# Patient Record
Sex: MALE | Race: WHITE | Employment: STUDENT | URBAN - METROPOLITAN AREA
[De-identification: names, ages, dates, MRNs, and addresses within clinical notes are randomized per-mention and may not be internally consistent; named-entity substitution may affect disease eponyms.]

---
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
## 2021-04-02 RX ORDER — CLINDAMYCIN PHOSPHATE 10 MG/G
GEL TOPICAL
Qty: 30 G | Refills: 6 | OUTPATIENT
Start: 2021-04-02

## 2021-04-02 NOTE — TELEPHONE ENCOUNTER
I called the mobile number listed which is the Dad's phone number was given [phone] by the dad to call. I left a voice message to call us back with Fritz Che. He has never been seen by Dr. Silverio Barnett or any of our providers. Will need an appointment for the requested medication or he can try obtaining from his pediatrician.

## 2021-05-20 ENCOUNTER — HOSPITAL ENCOUNTER (EMERGENCY)
Facility: HOSPITAL | Age: 21
Discharge: HOME | End: 2021-05-20
Payer: MEDICAID

## 2021-05-20 ENCOUNTER — APPOINTMENT (OUTPATIENT)
Dept: GENERAL RADIOLOGY | Facility: HOSPITAL | Age: 21
End: 2021-05-20
Payer: MEDICAID

## 2021-05-20 VITALS
SYSTOLIC BLOOD PRESSURE: 118 MMHG | DIASTOLIC BLOOD PRESSURE: 73 MMHG | WEIGHT: 192 LBS | HEIGHT: 73 IN | RESPIRATION RATE: 20 BRPM | OXYGEN SATURATION: 98 % | HEART RATE: 85 BPM | TEMPERATURE: 99 F | BODY MASS INDEX: 25.45 KG/M2

## 2021-05-20 DIAGNOSIS — S52.125A CLOSED NONDISPLACED FRACTURE OF HEAD OF LEFT RADIUS, INITIAL ENCOUNTER: Primary | ICD-10-CM

## 2021-05-20 PROCEDURE — 73080 X-RAY EXAM OF ELBOW: CPT

## 2021-05-20 PROCEDURE — 99284 EMERGENCY DEPT VISIT MOD MDM: CPT

## 2021-05-20 PROCEDURE — 99283 EMERGENCY DEPT VISIT LOW MDM: CPT

## 2021-05-20 RX ORDER — TRAMADOL HYDROCHLORIDE 50 MG/1
TABLET ORAL EVERY 6 HOURS PRN
Qty: 10 TABLET | Refills: 0 | Status: SHIPPED | OUTPATIENT
Start: 2021-05-20 | End: 2021-05-27

## 2021-05-20 RX ORDER — IBUPROFEN 800 MG/1
800 TABLET ORAL ONCE
Status: COMPLETED | OUTPATIENT
Start: 2021-05-20 | End: 2021-05-20

## 2021-05-21 NOTE — ED INITIAL ASSESSMENT (HPI)
Patient to Er from home with c/o left arm pain around elbow s/p fall while playing basketball. +swelling, +cms distally. Denies head injury LOC. Skin intact.

## 2021-05-21 NOTE — ED PROVIDER NOTES
Patient Seen in: [facility] AND [facility] Emergency Department      History   Patient presents with:  Arm or Hand Injury    Stated Complaint: l arm injury     HPI/Subjective:   21yo/m with no chronic medical problems reports to the ED with complaints of left el heart sounds. Pulmonary:      Effort: Pulmonary effort is normal.      Breath sounds: Normal breath sounds. Abdominal:      General: Bowel sounds are normal.      Palpations: Abdomen is soft.    Musculoskeletal:         General: Swelling and tenderness injury    Plan  Long arm applied  Cms intact after application    Close fu with ortho    Counseled patient on home care, ss of worsening condition, reasons for immediate re-eval, importance of close fu                              Disposition and Plan

## 2021-05-28 ENCOUNTER — OFFICE VISIT (OUTPATIENT)
Dept: ORTHOPEDICS CLINIC | Facility: CLINIC | Age: 21
End: 2021-05-28
Payer: MEDICAID

## 2021-05-28 ENCOUNTER — HOSPITAL ENCOUNTER (OUTPATIENT)
Dept: GENERAL RADIOLOGY | Facility: HOSPITAL | Age: 21
Discharge: HOME | End: 2021-05-28
Attending: ORTHOPAEDIC SURGERY
Payer: MEDICAID

## 2021-05-28 VITALS — BODY MASS INDEX: 25.18 KG/M2 | HEIGHT: 73 IN | WEIGHT: 190 LBS

## 2021-05-28 DIAGNOSIS — S52.125A CLOSED NONDISPLACED FRACTURE OF HEAD OF LEFT RADIUS, INITIAL ENCOUNTER: Primary | ICD-10-CM

## 2021-05-28 DIAGNOSIS — S52.125A CLOSED NONDISPLACED FRACTURE OF HEAD OF LEFT RADIUS, INITIAL ENCOUNTER: ICD-10-CM

## 2021-05-28 PROCEDURE — 3008F BODY MASS INDEX DOCD: CPT | Performed by: ORTHOPAEDIC SURGERY

## 2021-05-28 PROCEDURE — 99243 OFF/OP CNSLTJ NEW/EST LOW 30: CPT | Performed by: ORTHOPAEDIC SURGERY

## 2021-05-28 PROCEDURE — 24650 CLTX RDL HEAD/NCK FX WO MNPJ: CPT | Performed by: ORTHOPAEDIC SURGERY

## 2021-05-28 PROCEDURE — 73070 X-RAY EXAM OF ELBOW: CPT | Performed by: ORTHOPAEDIC SURGERY

## 2021-05-29 NOTE — H&P
NURSING INTAKE COMMENTS: Patient presents with:  Fracture: left radial at elbow, splinted in ER      HPI: This 24year old male presents today with complaints of left elbow injury.   The patient sustained an acute injury to his left elbow on May 20, 2021 wh denies emesis, no diarrhea  :  denies dysuria or difficulty urinating  MUSCULOSKELETAL: See HPI  NEURO: See HPI  PSYCHE: Denies depression or anxiety  HEMATOLOGIC: Denies hx of blood clots, or easy bleeding    Physical Examination:    Ht 6' 1\" (1.854 m) COMPLETE (MIN 3 VIEWS), LEFT (CPT=73080), 5/20/2021, 8:28 PM.  INDICATIONS: Follow up left elbow fracture 1 week ago. TECHNIQUE: Two views were obtained. FINDINGS: BONES: Old healing nondisplaced fracture of the radial head/neck noted.   Bones appear [place]

## 2021-06-02 ENCOUNTER — OFFICE VISIT (OUTPATIENT)
Dept: DERMATOLOGY CLINIC | Facility: CLINIC | Age: 21
End: 2021-06-02
Payer: MEDICAID

## 2021-06-02 DIAGNOSIS — L70.0 ACNE VULGARIS: ICD-10-CM

## 2021-06-02 DIAGNOSIS — L73.9 FOLLICULITIS: Primary | ICD-10-CM

## 2021-06-02 DIAGNOSIS — L30.9 DERMATITIS: ICD-10-CM

## 2021-06-02 DIAGNOSIS — L85.8 KERATOSIS PILARIS RUBRA: ICD-10-CM

## 2021-06-02 PROCEDURE — 99203 OFFICE O/P NEW LOW 30 MIN: CPT | Performed by: DERMATOLOGY

## 2021-06-02 RX ORDER — HYDROCORTISONE 25 MG/ML
LOTION TOPICAL
Qty: 60 ML | Refills: 0 | Status: SHIPPED | OUTPATIENT
Start: 2021-06-02 | End: 2021-06-15

## 2021-06-02 RX ORDER — DOXYCYCLINE HYCLATE 100 MG/1
CAPSULE ORAL
Qty: 30 CAPSULE | Refills: 6 | Status: SHIPPED | OUTPATIENT
Start: 2021-06-02 | End: 2021-12-28

## 2021-06-07 NOTE — PROGRESS NOTES
Yesenia Zayas is a 24year old male. Patient presents with:  Derm Problem: LOV (12/27/2011 ) Patient present with itchy red face . Patient c/o itching and redness after shaving. Patient denies any changed in facial soap.  Patient used OTC lotion for i Allergies    Past Medical History:   Diagnosis Date   • Closed right tibial fracture 9/2015     History reviewed. No pertinent surgical history.   Social History    Socioeconomic History      Marital status: Single      Spouse name: Not on file      Number with itchy red face . Patient c/o itching and redness after shaving. Patient denies any changed in facial soap.  Patient used OTC lotion for itching with no relief     Patient last office visit with [address] [facility] in 2011 concerned with redness over the face, bumpy ski eczematous areas and post shaving. Tretinoin start every 2 to 3 days combined with hydrocortisone to help with areas of folliculitis, pseudofolliculitis. And for management of acne. Keratosis pilaris. Pathophysiology discussed.   Chronic nature as und results found for this or any previous visit (from the past 50 hour(s)). Meds This Visit:      Imaging Orders:  None     Referral Orders:  No orders of the defined types were placed in this encounter.

## 2021-06-14 ENCOUNTER — TELEPHONE (OUTPATIENT)
Dept: DERMATOLOGY CLINIC | Facility: CLINIC | Age: 21
End: 2021-06-14

## 2021-06-15 ENCOUNTER — PATIENT MESSAGE (OUTPATIENT)
Dept: DERMATOLOGY CLINIC | Facility: CLINIC | Age: 21
End: 2021-06-15

## 2021-06-15 RX ORDER — HYDROCORTISONE 25 MG/ML
LOTION TOPICAL
Qty: 59 ML | Refills: 0 | Status: SHIPPED | OUTPATIENT
Start: 2021-06-15

## 2021-06-15 NOTE — TELEPHONE ENCOUNTER
Pt provided no additional info other than he did need rf. Message sent for more detailed information as he appears to be using this differently than prescribed.

## 2021-06-15 NOTE — TELEPHONE ENCOUNTER
Message sent back to pt, this is an excessive amount for what he should be using this for    Please triage further

## 2021-07-02 ENCOUNTER — OFFICE VISIT (OUTPATIENT)
Dept: ORTHOPEDICS CLINIC | Facility: CLINIC | Age: 21
End: 2021-07-02
Payer: MEDICAID

## 2021-07-02 ENCOUNTER — HOSPITAL ENCOUNTER (OUTPATIENT)
Dept: GENERAL RADIOLOGY | Facility: HOSPITAL | Age: 21
Discharge: HOME | End: 2021-07-02
Attending: ORTHOPAEDIC SURGERY
Payer: MEDICAID

## 2021-07-02 VITALS — BODY MASS INDEX: 25.18 KG/M2 | WEIGHT: 190 LBS | HEIGHT: 73 IN

## 2021-07-02 DIAGNOSIS — S52.125A CLOSED NONDISPLACED FRACTURE OF HEAD OF LEFT RADIUS, INITIAL ENCOUNTER: ICD-10-CM

## 2021-07-02 DIAGNOSIS — S52.125D CLOSED NONDISPLACED FRACTURE OF HEAD OF LEFT RADIUS WITH ROUTINE HEALING, SUBSEQUENT ENCOUNTER: Primary | ICD-10-CM

## 2021-07-02 PROCEDURE — 3008F BODY MASS INDEX DOCD: CPT | Performed by: ORTHOPAEDIC SURGERY

## 2021-07-02 PROCEDURE — 99024 POSTOP FOLLOW-UP VISIT: CPT | Performed by: ORTHOPAEDIC SURGERY

## 2021-07-02 PROCEDURE — 73070 X-RAY EXAM OF ELBOW: CPT | Performed by: ORTHOPAEDIC SURGERY

## 2021-07-03 NOTE — PROGRESS NOTES
NURSING INTAKE COMMENTS: Patient presents with: Follow - Up: pt in for f/u left radial at elbow, denies any pain today      HPI: This 24year old male presents today with complaints of follow-up left radial head fracture.   Patient has had improved left el HPI    Physical Examination:    Ht 6' 1\" (1.854 m)   Wt 190 lb (86.2 kg)   BMI 25.07 kg/m²   General appearance: alert and oriented, not in acute distress  Vascular: 2+ and symmetric pulses  Skin: intact and benign  Neurologic: Bilateral sensation intact to full activity and heavier lifting. If he has any recurrent pain or injury he should follow-up in the clinic for repeat evaluation and x-rays. The above note was creating using Dragon speech recognition technology. Please excuse any typos.     Ayesha Cerda Me

## 2021-07-13 RX ORDER — HYDROCORTISONE 25 MG/ML
LOTION TOPICAL
Qty: 59 ML | Refills: 0 | OUTPATIENT
Start: 2021-07-13

## 2021-07-28 ENCOUNTER — OFFICE VISIT (OUTPATIENT)
Dept: FAMILY MEDICINE CLINIC | Facility: CLINIC | Age: 21
End: 2021-07-28
Payer: MEDICAID

## 2021-07-28 VITALS
WEIGHT: 191.19 LBS | TEMPERATURE: 99 F | HEIGHT: 73 IN | BODY MASS INDEX: 25.34 KG/M2 | HEART RATE: 74 BPM | DIASTOLIC BLOOD PRESSURE: 68 MMHG | SYSTOLIC BLOOD PRESSURE: 115 MMHG

## 2021-07-28 DIAGNOSIS — Z02.5 SPORTS PHYSICAL: Primary | ICD-10-CM

## 2021-07-28 PROCEDURE — 3074F SYST BP LT 130 MM HG: CPT | Performed by: NURSE PRACTITIONER

## 2021-07-28 PROCEDURE — 3008F BODY MASS INDEX DOCD: CPT | Performed by: NURSE PRACTITIONER

## 2021-07-28 PROCEDURE — 3078F DIAST BP <80 MM HG: CPT | Performed by: NURSE PRACTITIONER

## 2021-07-28 PROCEDURE — 99395 PREV VISIT EST AGE 18-39: CPT | Performed by: NURSE PRACTITIONER

## 2021-07-28 NOTE — PROGRESS NOTES
HPI  Pt here for physical/sports physical. 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 [employer]    Has had J&J  covid vaccination    Had left elbow fx in early summer-seen by ortho and cleared for play low contact now and full contact 8/2-pt denies any pain, swelling or weak Vaccine: Birth to 64yrs Completed  Hepatitis B Vaccines Completed  MMR Vaccines Completed  Varicella Vaccines Completed  Meningococcal Vaccine Completed  HPV Vaccines Completed  COVID-19 Vaccine Completed  Hepatitis A Vaccines Aged Out    Past Medical Hist Partner Violence:       Fear of Current or Ex-Partner:       Emotionally Abused:       Physically Abused:       Sexually Abused:     Current Outpatient Medications   Medication Sig Dispense Refill   • Hydrocortisone 2.5 % External Lotion APPLY TOPICALLY TO the left inguinal area or right inguinal area. Genitourinary:     Penis: Normal and circumcised. No phimosis, erythema, tenderness or discharge.        Testes: Normal.      Epididymis:      Right: Normal.      Left: Normal.       Musculoskeletal:

## 2021-08-21 NOTE — TELEPHONE ENCOUNTER
LOV 06/02/2021 pt requesting refill for TRETINOIN 0.05 % External Cream please advise thank you [facility]

## 2021-12-28 RX ORDER — DOXYCYCLINE HYCLATE 100 MG/1
CAPSULE ORAL
Qty: 30 CAPSULE | Refills: 0 | Status: SHIPPED | OUTPATIENT
Start: 2021-12-28

## 2021-12-28 NOTE — TELEPHONE ENCOUNTER
PETRA, LOV 6/2021 - will try to obtain update, pt did no respond to last mychart msg. Dr. Victor Manuel Gerard do you wish to approve? Or wait for update?

## 2022-07-29 ENCOUNTER — OFFICE VISIT (OUTPATIENT)
Dept: FAMILY MEDICINE CLINIC | Facility: CLINIC | Age: 22
End: 2022-07-29
Payer: MEDICAID

## 2022-07-29 ENCOUNTER — LAB ENCOUNTER (OUTPATIENT)
Dept: LAB | Age: 22
End: 2022-07-29
Attending: FAMILY MEDICINE
Payer: MEDICAID

## 2022-07-29 VITALS
TEMPERATURE: 98 F | HEIGHT: 73 IN | SYSTOLIC BLOOD PRESSURE: 115 MMHG | HEART RATE: 55 BPM | DIASTOLIC BLOOD PRESSURE: 74 MMHG | BODY MASS INDEX: 26.11 KG/M2 | WEIGHT: 197 LBS

## 2022-07-29 DIAGNOSIS — Z00.00 ADULT GENERAL MEDICAL EXAM: ICD-10-CM

## 2022-07-29 DIAGNOSIS — Z00.00 ADULT GENERAL MEDICAL EXAM: Primary | ICD-10-CM

## 2022-07-29 DIAGNOSIS — Z23 NEED FOR VACCINATION: ICD-10-CM

## 2022-07-29 LAB
ALBUMIN SERPL-MCNC: 4.1 G/DL (ref 3.4–5)
ALBUMIN/GLOB SERPL: 1.2 {RATIO} (ref 1–2)
ALP LIVER SERPL-CCNC: 66 U/L
ALT SERPL-CCNC: 31 U/L
ANION GAP SERPL CALC-SCNC: 7 MMOL/L (ref 0–18)
AST SERPL-CCNC: 34 U/L (ref 15–37)
BASOPHILS # BLD AUTO: 0.02 X10(3) UL (ref 0–0.2)
BASOPHILS NFR BLD AUTO: 0.3 %
BILIRUB SERPL-MCNC: 1 MG/DL (ref 0.1–2)
BUN BLD-MCNC: 15 MG/DL (ref 7–18)
BUN/CREAT SERPL: 11.4 (ref 10–20)
CALCIUM BLD-MCNC: 9.4 MG/DL (ref 8.5–10.1)
CHLORIDE SERPL-SCNC: 106 MMOL/L (ref 98–112)
CHOLEST SERPL-MCNC: 140 MG/DL (ref ?–200)
CO2 SERPL-SCNC: 27 MMOL/L (ref 21–32)
CREAT BLD-MCNC: 1.32 MG/DL
DEPRECATED RDW RBC AUTO: 43.7 FL (ref 35.1–46.3)
EOSINOPHIL # BLD AUTO: 0.02 X10(3) UL (ref 0–0.7)
EOSINOPHIL NFR BLD AUTO: 0.3 %
ERYTHROCYTE [DISTWIDTH] IN BLOOD BY AUTOMATED COUNT: 12.6 % (ref 11–15)
FASTING PATIENT LIPID ANSWER: YES
FASTING STATUS PATIENT QL REPORTED: YES
GLOBULIN PLAS-MCNC: 3.4 G/DL (ref 2.8–4.4)
GLUCOSE BLD-MCNC: 89 MG/DL (ref 70–99)
HCT VFR BLD AUTO: 48 %
HDLC SERPL-MCNC: 62 MG/DL (ref 40–59)
HGB BLD-MCNC: 15.9 G/DL
IMM GRANULOCYTES # BLD AUTO: 0.01 X10(3) UL (ref 0–1)
IMM GRANULOCYTES NFR BLD: 0.2 %
LDLC SERPL CALC-MCNC: 68 MG/DL (ref ?–100)
LYMPHOCYTES # BLD AUTO: 1.71 X10(3) UL (ref 1–4)
LYMPHOCYTES NFR BLD AUTO: 26.1 %
MCH RBC QN AUTO: 30.9 PG (ref 26–34)
MCHC RBC AUTO-ENTMCNC: 33.1 G/DL (ref 31–37)
MCV RBC AUTO: 93.2 FL
MONOCYTES # BLD AUTO: 0.53 X10(3) UL (ref 0.1–1)
MONOCYTES NFR BLD AUTO: 8.1 %
NEUTROPHILS # BLD AUTO: 4.26 X10 (3) UL (ref 1.5–7.7)
NEUTROPHILS # BLD AUTO: 4.26 X10(3) UL (ref 1.5–7.7)
NEUTROPHILS NFR BLD AUTO: 65 %
NONHDLC SERPL-MCNC: 78 MG/DL (ref ?–130)
OSMOLALITY SERPL CALC.SUM OF ELEC: 290 MOSM/KG (ref 275–295)
PLATELET # BLD AUTO: 240 10(3)UL (ref 150–450)
POTASSIUM SERPL-SCNC: 3.9 MMOL/L (ref 3.5–5.1)
PROT SERPL-MCNC: 7.5 G/DL (ref 6.4–8.2)
RBC # BLD AUTO: 5.15 X10(6)UL
SODIUM SERPL-SCNC: 140 MMOL/L (ref 136–145)
TRIGL SERPL-MCNC: 41 MG/DL (ref 30–149)
TSI SER-ACNC: 3.7 MIU/ML (ref 0.36–3.74)
VLDLC SERPL CALC-MCNC: 6 MG/DL (ref 0–30)
WBC # BLD AUTO: 6.6 X10(3) UL (ref 4–11)

## 2022-07-29 PROCEDURE — 90471 IMMUNIZATION ADMIN: CPT | Performed by: FAMILY MEDICINE

## 2022-07-29 PROCEDURE — 3008F BODY MASS INDEX DOCD: CPT | Performed by: FAMILY MEDICINE

## 2022-07-29 PROCEDURE — 80061 LIPID PANEL: CPT

## 2022-07-29 PROCEDURE — 36415 COLL VENOUS BLD VENIPUNCTURE: CPT

## 2022-07-29 PROCEDURE — 80053 COMPREHEN METABOLIC PANEL: CPT

## 2022-07-29 PROCEDURE — 99395 PREV VISIT EST AGE 18-39: CPT | Performed by: FAMILY MEDICINE

## 2022-07-29 PROCEDURE — 3074F SYST BP LT 130 MM HG: CPT | Performed by: FAMILY MEDICINE

## 2022-07-29 PROCEDURE — 84443 ASSAY THYROID STIM HORMONE: CPT

## 2022-07-29 PROCEDURE — 3078F DIAST BP <80 MM HG: CPT | Performed by: FAMILY MEDICINE

## 2022-07-29 PROCEDURE — 90715 TDAP VACCINE 7 YRS/> IM: CPT | Performed by: FAMILY MEDICINE

## 2022-07-29 PROCEDURE — 85025 COMPLETE CBC W/AUTO DIFF WBC: CPT

## 2022-08-27 NOTE — PATIENT INSTRUCTIONS
Well-Child Checkup: 15 to 25 Years     Stay involved in your teen’s life. Make sure your teen knows you’re always there when he or she needs to talk. During the teen years, it’s important to keep having yearly checkups.  Your teen may be embarrassed abo · Body changes. The body grows and matures during puberty. Hair will grow in the pubic area and on other parts of the body. Girls grow breasts and menstruate (have monthly periods). A boy’s voice changes, becoming lower and deeper.  As the penis matures, er · Eat healthy. Your child should eat fruits, vegetables, lean meats, and whole grains every day. Less healthy foods—like french fries, candy, and chips—should be eaten rarely.  Some teens fall into the trap of snacking on junk food and fast food throughout · Encourage your teen to keep a consistent bedtime, even on weekends. Sleeping is easier when the body follows a routine. Don’t let your teen stay up too late at night or sleep in too long in the morning. · Help your teen wake up, if needed.  Go into the b · Set rules and limits around driving and use of the car. If your teen gets a ticket or has an accident, there should be consequences. Driving is a privilege that can be taken away if your child doesn’t follow the rules.   · Teach your child to make good de © 4344-0822 [employer] [address]. [address] [facility], [address] [place]. All rights reserved. This information is not intended as a substitute for professional medical care. Always follow your healthcare professional's instructions.         Wt Read 09/12/2003      TDAP                  05/23/2012      Varicella             07/12/2002 05/23/2012    Pended                  Date(s) Pended    Hpv Virus Vaccine 9 Mounika Im                          06/04/2019      Meningococcal B, Omv This content is reviewed periodically and is subject to change as new health information becomes available.  The information is intended to inform and educate and is not a replacement for medical evaluation, advice, diagnosis or treatment by a healthcare pr [facility]

## 2023-01-13 ENCOUNTER — LAB ENCOUNTER (OUTPATIENT)
Dept: LAB | Age: 23
End: 2023-01-13
Attending: FAMILY MEDICINE
Payer: MEDICAID

## 2023-01-13 DIAGNOSIS — N28.9 RENAL INSUFFICIENCY: ICD-10-CM

## 2023-01-13 LAB
ANION GAP SERPL CALC-SCNC: 2 MMOL/L (ref 0–18)
BUN BLD-MCNC: 16 MG/DL (ref 7–18)
BUN/CREAT SERPL: 12.8 (ref 10–20)
CALCIUM BLD-MCNC: 9.7 MG/DL (ref 8.5–10.1)
CHLORIDE SERPL-SCNC: 103 MMOL/L (ref 98–112)
CO2 SERPL-SCNC: 31 MMOL/L (ref 21–32)
CREAT BLD-MCNC: 1.25 MG/DL
FASTING STATUS PATIENT QL REPORTED: NO
GFR SERPLBLD BASED ON 1.73 SQ M-ARVRAT: 84 ML/MIN/1.73M2 (ref 60–?)
GLUCOSE BLD-MCNC: 99 MG/DL (ref 70–99)
OSMOLALITY SERPL CALC.SUM OF ELEC: 283 MOSM/KG (ref 275–295)
POTASSIUM SERPL-SCNC: 4.2 MMOL/L (ref 3.5–5.1)
SODIUM SERPL-SCNC: 136 MMOL/L (ref 136–145)

## 2023-01-13 PROCEDURE — 80048 BASIC METABOLIC PNL TOTAL CA: CPT

## 2023-01-13 PROCEDURE — 36415 COLL VENOUS BLD VENIPUNCTURE: CPT

## 2023-01-20 ENCOUNTER — OFFICE VISIT (OUTPATIENT)
Dept: PODIATRY CLINIC | Facility: CLINIC | Age: 23
End: 2023-01-20

## 2023-01-20 DIAGNOSIS — L60.3 NAIL DYSTROPHY: Primary | ICD-10-CM

## 2023-01-20 PROCEDURE — 99203 OFFICE O/P NEW LOW 30 MIN: CPT | Performed by: STUDENT IN AN ORGANIZED HEALTH CARE EDUCATION/TRAINING PROGRAM

## 2023-01-22 LAB — CALCOFLUOR WHITE STAIN RESULT: NEGATIVE

## 2023-11-06 ENCOUNTER — OFFICE VISIT (OUTPATIENT)
Dept: FAMILY MEDICINE CLINIC | Facility: CLINIC | Age: 23
End: 2023-11-06

## 2023-11-06 VITALS
BODY MASS INDEX: 25.22 KG/M2 | HEART RATE: 51 BPM | SYSTOLIC BLOOD PRESSURE: 123 MMHG | WEIGHT: 190.25 LBS | DIASTOLIC BLOOD PRESSURE: 69 MMHG | HEIGHT: 73 IN | TEMPERATURE: 97 F

## 2023-11-06 DIAGNOSIS — Z23 NEED FOR VACCINATION: ICD-10-CM

## 2023-11-06 DIAGNOSIS — L90.5 SCAR OF FACE: Primary | ICD-10-CM

## 2023-11-06 DIAGNOSIS — S01.80XA OPEN WOUND OF FACE, INITIAL ENCOUNTER: ICD-10-CM

## 2024-01-02 ENCOUNTER — OFFICE VISIT (OUTPATIENT)
Dept: DERMATOLOGY CLINIC | Facility: CLINIC | Age: 24
End: 2024-01-02

## 2024-01-02 DIAGNOSIS — L91.0 HYPERTROPHIC SCAR: ICD-10-CM

## 2024-01-02 DIAGNOSIS — L21.9 SEBORRHEIC DERMATITIS: Primary | ICD-10-CM

## 2024-01-02 PROCEDURE — 99214 OFFICE O/P EST MOD 30 MIN: CPT | Performed by: STUDENT IN AN ORGANIZED HEALTH CARE EDUCATION/TRAINING PROGRAM

## 2024-01-02 PROCEDURE — 11900 INJECT SKIN LESIONS </W 7: CPT | Performed by: STUDENT IN AN ORGANIZED HEALTH CARE EDUCATION/TRAINING PROGRAM

## 2024-01-02 RX ORDER — FLUOCINONIDE TOPICAL SOLUTION USP, 0.05% 0.5 MG/ML
SOLUTION TOPICAL
Qty: 60 ML | Refills: 5 | Status: SHIPPED | OUTPATIENT
Start: 2024-01-02

## 2024-01-02 RX ORDER — KETOCONAZOLE 20 MG/ML
SHAMPOO TOPICAL
Qty: 120 ML | Refills: 11 | Status: SHIPPED | OUTPATIENT
Start: 2024-01-02

## 2024-01-02 NOTE — PROGRESS NOTES
New Patient    Referred by: No referring provider defined for this encounter.    CHIEF COMPLAINT: Lesion of concern     HISTORY OF PRESENT ILLNESS: Christopher Chao is a 23 year old male here for evaluation of lesion of concern.    1. Growth   Location: Above right lip  Duration: 1 year  Signs and symptoms: Raised   Current treatment: OTC silicon gel  Past treatments: None      Personal Dermatologic History  History of skin cancer: No  History of  atypical moles: No    FAMILY HISTORY:  History of melanoma: No    Past Medical History  Past Medical History:   Diagnosis Date    Closed right tibial fracture 9/2015       REVIEW OF SYSTEMS:  Constitutional: Denies fever, chills, unintentional weight loss.   Skin as per HPI    Medications  Current Outpatient Medications   Medication Sig Dispense Refill    Multiple Vitamin (MULTI VITAMIN DAILY OR) No of Refills: 0      doxycycline 100 MG Oral Cap TAKE ONE CAPSULE BY MOUTH EVERY DAY (Patient not taking: Reported on 11/6/2023) 30 capsule 0    Tretinoin 0.05 % External Cream APPLY TOPICALLY TO ENTIRE FACE, CHEST, AND BACK EVERY NIGHT AT BEDTIME (Patient not taking: Reported on 11/6/2023) 45 g 0    Hydrocortisone 2.5 % External Lotion APPLY TOPICALLY TO THE AFFECTED AREA EVERY DAY AS NEEDED FOR IRRITATION (Patient not taking: Reported on 11/6/2023) 59 mL 0    metRONIDAZOLE 0.75 % External Cream Use bid to affected areas of face (Patient not taking: Reported on 11/6/2023) 60 g 12    CLINDAMYCIN PHOSPHATE 1 % External Gel APPLY EXTERNALLY TO THE AFFECTED AREA TWICE DAILY (Patient not taking: Reported on 11/6/2023) 30 g 6    Clindamycin Phos-Benzoyl Perox 1.2-5 % External Gel SHERICE EXT AA BID (Patient not taking: Reported on 11/6/2023) 1 Tube 6       PHYSICAL EXAM:  General: awake, alert, no acute distress  Neuropsych: appropriate mood and affect  Eyes: Sclerae anicteric, without conjunctival injection, eyelids unremarkable  Skin: Skin exam was performed today including the  following: scalp and upper cutaneous lip . Pertinent findings include:   - Upper cutaneous lip with a pink sclerotic papule  - Scalp with greasy yellow scaling    ASSESSMENT & PLAN:  Pathophysiology of diagnoses discussed with patient.  Therapeutic options reviewed. Risks, benefits, and alternatives discussed with patient. Instructions reviewed at length.    #Seborrheic dermatitis  - We discussed the etiology, natural history, and chronic, waxing/waning nature of seborrheic dermatitis. Educated patient that seborrheic dermatitis is typically a chronic problem due to inflammation in response to yeast (Malassezia species). Discussed that maintenance is desired rather than cure.    For the scalp:  - Prescribed ketoconazole 2% shampoo three times weekly, leaving in five to ten minutes before washing out. Use over-the-counter Neutrogena T/gel, Selsun, Head & Shoulders, ZincOn shampoo on other days. Consider occasionally rotating shampoos, given that the efficacy of certain products can change with time.  - Lidex solution to scalp Monday-Friday. Take weekends off. Avoid use on face, breasts, groin, or axiillae. Counseled patient regarding side effects of topical steroids including, but are not limited to: atrophy, striae, telangectasias, tachyphylaxis, pigmentary changes, as well as risk of cataracts and glaucoma with periocular use.  Informed patient that topical steroids should only be used on active skin lesions and not on normal skin.       #Hypertrophic scar  - Due to the bothersome nature of the scar today, recommend injection with Kenalog, as below. Side effects of intralesional steroid injection discussed, including the potential for atrophy, striae, telangiectasias, and pigmentary changes.  Patient understands and would like to proceed with injection today.  - After cleansing with alcohol, cutaneous lip injected with a total of 0.1 ml of Kenalog 10mg/ml. Patient tolerated procedure well. Bandage applied to  injection site.    Return to clinic: 3 months or sooner if something concerning arises     John Walker MD

## 2024-02-06 ENCOUNTER — OFFICE VISIT (OUTPATIENT)
Dept: DERMATOLOGY CLINIC | Facility: CLINIC | Age: 24
End: 2024-02-06

## 2024-02-06 DIAGNOSIS — L90.5 SCAR CONDITION AND FIBROSIS OF SKIN: ICD-10-CM

## 2024-02-06 DIAGNOSIS — L91.0 HYPERTROPHIC SCAR: Primary | ICD-10-CM

## 2024-02-06 PROCEDURE — 11900 INJECT SKIN LESIONS </W 7: CPT | Performed by: STUDENT IN AN ORGANIZED HEALTH CARE EDUCATION/TRAINING PROGRAM

## 2024-02-06 NOTE — PROGRESS NOTES
Established Patient    Referred by: No referring provider defined for this encounter.    CHIEF COMPLAINT: Spot on Right upper lip    HISTORY OF PRESENT ILLNESS: Christopher Chao is a 23 year old male here for evaluation of spot on right upper lip    1. Growth   Location: Right upper lip  Duration: 1 year  Signs and symptoms: Spot went down a little after the shot but they got bigger.  Current treatment: NONE  Past treatments: Kenalog shot      Personal Dermatologic History  History of skin cancer: No  History of  atypical moles: No    FAMILY HISTORY:  History of melanoma: No    Past Medical History  Past Medical History:   Diagnosis Date    Closed right tibial fracture 9/2015       REVIEW OF SYSTEMS:  Constitutional: Denies fever, chills, unintentional weight loss.   Skin as per HPI    Medications  Current Outpatient Medications   Medication Sig Dispense Refill    Fluocinonide 0.05 % External Solution Use twice daily Monday-Friday with flares. Do not use on face. 60 mL 5    ketoconazole 2 % External Shampoo Use 2-3 times weekly. Lather into scalp and leave on for 5 minutes before washing off. 120 mL 11    Multiple Vitamin (MULTI VITAMIN DAILY OR) No of Refills: 0 (Patient not taking: Reported on 1/2/2024)      doxycycline 100 MG Oral Cap TAKE ONE CAPSULE BY MOUTH EVERY DAY (Patient not taking: Reported on 11/6/2023) 30 capsule 0    Tretinoin 0.05 % External Cream APPLY TOPICALLY TO ENTIRE FACE, CHEST, AND BACK EVERY NIGHT AT BEDTIME (Patient not taking: Reported on 11/6/2023) 45 g 0    Hydrocortisone 2.5 % External Lotion APPLY TOPICALLY TO THE AFFECTED AREA EVERY DAY AS NEEDED FOR IRRITATION (Patient not taking: Reported on 11/6/2023) 59 mL 0    metRONIDAZOLE 0.75 % External Cream Use bid to affected areas of face (Patient not taking: Reported on 11/6/2023) 60 g 12    CLINDAMYCIN PHOSPHATE 1 % External Gel APPLY EXTERNALLY TO THE AFFECTED AREA TWICE DAILY (Patient not taking: Reported on 11/6/2023) 30 g 6     Clindamycin Phos-Benzoyl Perox 1.2-5 % External Gel SHERICE EXT AA BID (Patient not taking: Reported on 11/6/2023) 1 Tube 6       PHYSICAL EXAM:  General: awake, alert, no acute distress  Neuropsych: appropriate mood and affect  Eyes: Sclerae anicteric, without conjunctival injection, eyelids unremarkable  Skin: Skin exam was performed today including the following: R upper cutaneous lip. Pertinent findings include:   - with a pink sclerotic patch   - with a hypertrophic scar     ASSESSMENT & PLAN:  Pathophysiology of diagnoses discussed with patient.  Therapeutic options reviewed. Risks, benefits, and alternatives discussed with patient. Instructions reviewed at length.    #Scar  Discussed etiology, educated and reassured   Reassured patient that the scar will continue to heal and lighten over the next few months.  Patient verbalizes understanding and agrees with treatment plan.    #Hypertrophic scar  - Intralesional Kenalog (triamcinolone) injection today     Concentration: 10 mg/mL  Site: R upper cutaneous lip  Total volume injected: 0.1cc    Patient was counseled on the possible side effects of injection: pain at site, infection, atrophy of skin, systemic absorption, hypopigmentation, loss of hair.  Questions if any were addressed before proceeding with prep of the site with rubbing alcohol.  Kenalog was injected with sterile syringe and 25 g needle.  Patient tolerated well.       Return to clinic: 6 weeks or sooner if something concerning arises     John Walker MD

## 2024-03-20 ENCOUNTER — OFFICE VISIT (OUTPATIENT)
Dept: DERMATOLOGY CLINIC | Facility: CLINIC | Age: 24
End: 2024-03-20

## 2024-03-20 DIAGNOSIS — L91.0 HYPERTROPHIC SCAR: Primary | ICD-10-CM

## 2024-03-20 PROCEDURE — 99213 OFFICE O/P EST LOW 20 MIN: CPT | Performed by: STUDENT IN AN ORGANIZED HEALTH CARE EDUCATION/TRAINING PROGRAM

## 2024-03-20 PROCEDURE — 11900 INJECT SKIN LESIONS </W 7: CPT | Performed by: STUDENT IN AN ORGANIZED HEALTH CARE EDUCATION/TRAINING PROGRAM

## 2024-03-20 NOTE — PROGRESS NOTES
March 20, 2024    Established patient     CHIEF COMPLAINT: F/u on scar    HISTORY OF PRESENT ILLNESS: .    1. Growth   Location: Right upper lip  Duration: 1 year  Signs and symptoms: Spot has been going down since injections   Current treatment: NONE  Past treatments: Kenalog shot    DERM HISTORY:  History of skin cancer: No  History of chronic skin disease/condition: No    FAMILY HISTORY:  History of melanoma: No  History of chronic skin disease/condition: No    History/Other:    REVIEW OF SYSTEMS:  Constitutional: Denies fever, chills, unintentional weight loss.   Skin as per HPI    PAST MEDICAL HISTORY:  Past Medical History:   Diagnosis Date    Closed right tibial fracture 9/2015       Medications  Current Outpatient Medications   Medication Sig Dispense Refill    Fluocinonide 0.05 % External Solution Use twice daily Monday-Friday with flares. Do not use on face. 60 mL 5    ketoconazole 2 % External Shampoo Use 2-3 times weekly. Lather into scalp and leave on for 5 minutes before washing off. 120 mL 11    Multiple Vitamin (MULTI VITAMIN DAILY OR) No of Refills: 0 (Patient not taking: Reported on 1/2/2024)      doxycycline 100 MG Oral Cap TAKE ONE CAPSULE BY MOUTH EVERY DAY (Patient not taking: Reported on 11/6/2023) 30 capsule 0    Tretinoin 0.05 % External Cream APPLY TOPICALLY TO ENTIRE FACE, CHEST, AND BACK EVERY NIGHT AT BEDTIME 45 g 0    Hydrocortisone 2.5 % External Lotion APPLY TOPICALLY TO THE AFFECTED AREA EVERY DAY AS NEEDED FOR IRRITATION (Patient not taking: Reported on 11/6/2023) 59 mL 0    metRONIDAZOLE 0.75 % External Cream Use bid to affected areas of face (Patient not taking: Reported on 11/6/2023) 60 g 12    CLINDAMYCIN PHOSPHATE 1 % External Gel APPLY EXTERNALLY TO THE AFFECTED AREA TWICE DAILY (Patient not taking: Reported on 11/6/2023) 30 g 6    Clindamycin Phos-Benzoyl Perox 1.2-5 % External Gel SHERICE EXT AA BID (Patient not taking: Reported on 11/6/2023) 1 Tube 6       Objective:     PHYSICAL EXAM:  General: awake, alert, no acute distress  Skin: Skin exam was performed today including the following: R upper cuteneous lip. Pertinent findings include:   - with sclerotic plaque    ASSESSMENT & PLAN:  Pathophysiology of diagnoses discussed with patient.  Therapeutic options reviewed. Risks, benefits, and alternatives discussed with patient. Instructions reviewed at length.    #Hypertrophic scar   - [place] traveling in near future. Recommended sunscreen with SPF 30+ to affected area  - Intralesional Kenalog (triamcinolone) injection today     Concentration: 10 mg/mL  Site: R upper cutaneous lip  Total volume injected: 0.1cc    Patient was counseled on the possible side effects of injection: pain at site, infection, atrophy of skin, systemic absorption, hypopigmentation, loss of hair.  Questions if any were addressed before proceeding with prep of the site with rubbing alcohol.  Kenalog was injected with sterile syringe and 25 g needle.  Patient tolerated well.       Return to clinic: 4-6 weeks or sooner if something concerning arises     John Walker MD

## 2024-08-09 ENCOUNTER — HOSPITAL ENCOUNTER (OUTPATIENT)
Age: 24
Discharge: HOME | End: 2024-08-09
Payer: COMMERCIAL

## 2024-08-09 ENCOUNTER — APPOINTMENT (OUTPATIENT)
Dept: GENERAL RADIOLOGY | Age: 24
End: 2024-08-09
Attending: Physician Assistant
Payer: COMMERCIAL

## 2024-08-09 VITALS
SYSTOLIC BLOOD PRESSURE: 113 MMHG | OXYGEN SATURATION: 99 % | HEART RATE: 61 BPM | RESPIRATION RATE: 18 BRPM | TEMPERATURE: 98 F | DIASTOLIC BLOOD PRESSURE: 64 MMHG

## 2024-08-09 DIAGNOSIS — S93.402A SPRAIN OF LEFT ANKLE, UNSPECIFIED LIGAMENT, INITIAL ENCOUNTER: Primary | ICD-10-CM

## 2024-08-09 PROCEDURE — E0114 CRUTCH UNDERARM PAIR NO WOOD: HCPCS | Performed by: PHYSICIAN ASSISTANT

## 2024-08-09 PROCEDURE — 99203 OFFICE O/P NEW LOW 30 MIN: CPT | Performed by: PHYSICIAN ASSISTANT

## 2024-08-09 PROCEDURE — 73610 X-RAY EXAM OF ANKLE: CPT | Performed by: PHYSICIAN ASSISTANT

## 2024-08-09 PROCEDURE — A6449 LT COMPRES BAND >=3" <5"/YD: HCPCS | Performed by: PHYSICIAN ASSISTANT

## 2024-08-09 NOTE — ED PROVIDER NOTES
Chief Complaint   Patient presents with    Ankle Injury       History obtained from: patient   services not used    HPI:     Christopher Chao is a 24 year old male who presents with left ankle injury sustained last night.  Patient states he rolled his left ankle while playing basketball.  Patient notes pain and swelling to lateral left ankle.  Patient denies any other injury sustained or complaints at this time.  Denies head injury, LOC, neck pain, back pain, wound, change in skin color/temperature.  Denies history of previous ankle injuries.    PMH  Past Medical History:    Closed right tibial fracture       PFSH    PFSH asessment screens reviewed and agree.  Nurses notes reviewed I agree with documentation.    Family History   Problem Relation Age of Onset    Diabetes Maternal Grandfather     Diabetes Maternal Aunt     Diabetes Father     Diabetes Mother     Cancer Maternal Grandmother         breast    Glaucoma Other      Family history reviewed with patient/caregiver and is not pertinent to presenting problem.  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[employer], football   Tobacco Use    Smoking status: Never    Smokeless tobacco: Never   Substance and Sexual Activity    Alcohol use: Yes    Drug use: Never    Sexual activity: Not on file   Other Topics Concern    Grew up on a farm Not Asked    History of tanning Not Asked    Outdoor occupation Not Asked    Reaction to local anesthetic No    Pt has a pacemaker No    Pt has a defibrillator No   Social History Narrative    Not on file     Social Determinants of Health     Financial Resource Strain: Not on file   Food Insecurity: Not on file   Transportation Needs: Not on file   Physical Activity: Not on file   Stress: Not on file   Social  Connections: Not on file   Housing Stability: Not on file         ROS:   Positive for stated complaint: Left ankle pain and swelling  All other systems reviewed and negative except as noted above.  Constitutional and Vital Signs Reviewed.    Physical Exam:     Findings:    /64   Pulse 61   Temp 97.5 °F (36.4 °C) (Temporal)   Resp 18   SpO2 99%   GENERAL: well developed, no acute distress, non-toxic appearing   SKIN: good skin turgor, no obvious rashes  HEAD: normocephalic, atraumatic  EYES: sclera non-icteric bilaterally, conjunctiva clear bilaterally  OROPHARYNX: MMM, maintaining airway and secretions  NECK: no nuchal rigidity, no trismus, no edema, phonation normal    CARDIO: Regular rate, DP pulse 2+ bilaterally, cap refill less than 2 seconds  LUNGS: no increased WOB  EXTREMITIES: Swelling and tenderness to lateral left ankle, no obvious deformity, ROM somewhat limited due to pain and swelling however able to move foot and all digits, compartments soft, CMS intact, skin intact without overlying changes  NEURO: no focal deficits  PSYCH: alert and oriented x3, answering questions appropriately, mood appropriate    MDM/Assessment/Plan:   Orders for this encounter:    Orders Placed This Encounter    XR ANKLE (MIN 3 VIEWS), LEFT (CPT=73610)     Order Specific Question:   What is the Relevant Clinical Indication / Reason for Exam?     Answer:   Left Leg Injury     Order Specific Question:   Release to patient     Answer:   Immediate    Ace wrap     To affected area    Crutch walk training     Crutch walk training    Crutches       Labs performed this visit:  No results found for this or any previous visit (from the past 10 hour(s)).    Imaging performed this visit:  XR ANKLE (MIN 3 VIEWS), LEFT (CPT=73610)   Final Result   PROCEDURE: XR ANKLE (MIN 3 VIEWS), LEFT (CPT=73610)       COMPARISON: None.       INDICATIONS: Sports injury x 1 day. Pain in lateral side of left ankle       TECHNIQUE: Three-view    Findings and impression:  Lateral soft tissue swelling.  Normal alignment    with no fracture   Finalized by (CST): Michael Davis MD on 8/09/2024 at 3:46 PM                            Medical Decision Making  DDx includes sprain versus fracture versus contusion versus other.  No signs of neurovascular compromise or compartment syndrome.  No other injury sustained or complaints at this time apart from left ankle pain.  X-ray reviewed, no evidence of acute osseous abnormality, lateral soft tissue swelling noted.  Discussed results with patient.  Ace wrap applied to the left ankle.  Crutches dispensed and instructions for use demonstrated due to pain with ambulation.  Discussed supportive care including rest, ice, elevation, and OTC Tylenol/Motrin as needed for pain.  Instructed patient to go directly to nearest ER with any worsening or concerning symptoms.  Follow-up with ortho.    Amount and/or Complexity of Data Reviewed  Radiology: ordered and independent interpretation performed.    Risk  OTC drugs.          Diagnosis:    ICD-10-CM    1. Sprain of left ankle, unspecified ligament, initial encounter  S93.402A           All results reviewed and discussed with patient/patient's family. Patient/patient's family verbalize excellent understanding of instructions and feels comfortable with plan. All of patient's/patient's family's questions were addressed.   See AVS for detailed discharge instructions for your condition today.    Follow Up with:  Derrek Heath MD  [facility] [address],  [place] [address] [phone]      Ortho      Note: This document was dictated using Dragon medical dictation software.  Proofreading was performed to the best of my ability, but errors may be present.    Rosalia Ledbetter PA-C

## 2024-08-09 NOTE — ED INITIAL ASSESSMENT (HPI)
Pt sts that he rolled his left ankle yesterday while playing basketball, c/o pain + swelling to affected area

## 2024-08-09 NOTE — DISCHARGE INSTRUCTIONS
Rest, ice, and elevate ankle  Alternate Motrin/Tylenol as needed for pain   Drink plenty of fluids   Get plenty of rest   Avoid excessive twisting, bending, or lifting   Follow up with ortho

## 2024-12-09 ENCOUNTER — TELEPHONE (OUTPATIENT)
Dept: FAMILY MEDICINE CLINIC | Facility: CLINIC | Age: 24
End: 2024-12-09

## 2024-12-09 NOTE — TELEPHONE ENCOUNTER
Patient scheduled an appointment via MyChart for the following concern:    Trouble breathing and scalp inflammation

## 2024-12-26 ENCOUNTER — OFFICE VISIT (OUTPATIENT)
Dept: DERMATOLOGY CLINIC | Facility: CLINIC | Age: 24
End: 2024-12-26

## 2024-12-26 DIAGNOSIS — L90.5 SCAR CONDITION AND FIBROSIS OF SKIN: Primary | ICD-10-CM

## 2024-12-26 DIAGNOSIS — L21.9 SEBORRHEIC DERMATITIS: ICD-10-CM

## 2024-12-26 PROCEDURE — 99214 OFFICE O/P EST MOD 30 MIN: CPT | Performed by: STUDENT IN AN ORGANIZED HEALTH CARE EDUCATION/TRAINING PROGRAM

## 2024-12-26 PROCEDURE — 11900 INJECT SKIN LESIONS </W 7: CPT | Performed by: STUDENT IN AN ORGANIZED HEALTH CARE EDUCATION/TRAINING PROGRAM

## 2024-12-26 RX ORDER — KETOCONAZOLE 20 MG/ML
SHAMPOO, SUSPENSION TOPICAL
Qty: 120 ML | Refills: 11 | Status: SHIPPED | OUTPATIENT
Start: 2024-12-26

## 2024-12-26 RX ORDER — ROFLUMILAST 3 MG/G
1 AEROSOL, FOAM TOPICAL DAILY
Qty: 60 G | Refills: 3 | Status: SHIPPED | OUTPATIENT
Start: 2024-12-26

## 2024-12-26 NOTE — PROGRESS NOTES
Established patient     CHIEF COMPLAINT: Scalp, Kenalog in    HISTORY OF PRESENT ILLNESS: .    1. Irritation   Location: Scalp  Duration: 1 year  Signs and symptoms: Burning  Current treatment: Ketoconazole shampoo- reports that it may be the cause   Past treatments: Ketoconazole    DERM HISTORY:  History of skin cancer: No  History of chronic skin disease/condition: No    FAMILY HISTORY:  History of melanoma: No  History of chronic skin disease/condition: No    History/Other:    REVIEW OF SYSTEMS:  Constitutional: Denies fever, chills, unintentional weight loss.   Skin as per HPI    PAST MEDICAL HISTORY:  Past Medical History:    Closed right tibial fracture       Medications  Current Outpatient Medications   Medication Sig Dispense Refill    Fluocinonide 0.05 % External Solution Use twice daily Monday-Friday with flares. Do not use on face. 60 mL 5    ketoconazole 2 % External Shampoo Use 2-3 times weekly. Lather into scalp and leave on for 5 minutes before washing off. 120 mL 11    Multiple Vitamin (MULTI VITAMIN DAILY OR) No of Refills: 0 (Patient not taking: Reported on 1/2/2024)      doxycycline 100 MG Oral Cap TAKE ONE CAPSULE BY MOUTH EVERY DAY (Patient not taking: Reported on 11/6/2023) 30 capsule 0    Tretinoin 0.05 % External Cream APPLY TOPICALLY TO ENTIRE FACE, CHEST, AND BACK EVERY NIGHT AT BEDTIME 45 g 0    Hydrocortisone 2.5 % External Lotion APPLY TOPICALLY TO THE AFFECTED AREA EVERY DAY AS NEEDED FOR IRRITATION (Patient not taking: Reported on 11/6/2023) 59 mL 0    metRONIDAZOLE 0.75 % External Cream Use bid to affected areas of face (Patient not taking: Reported on 11/6/2023) 60 g 12    CLINDAMYCIN PHOSPHATE 1 % External Gel APPLY EXTERNALLY TO THE AFFECTED AREA TWICE DAILY (Patient not taking: Reported on 11/6/2023) 30 g 6    Clindamycin Phos-Benzoyl Perox 1.2-5 % External Gel SHERICE EXT AA BID (Patient not taking: Reported on 11/6/2023) 1 Tube 6       Objective:    PHYSICAL EXAM:  General:  awake, alert, no acute distress  Skin: Skin exam was performed today including the following: R upper cuteneous lip. Pertinent findings include:   - with sclerotic plaque    ASSESSMENT & PLAN:  Pathophysiology of diagnoses discussed with patient.  Therapeutic options reviewed. Risks, benefits, and alternatives discussed with patient. Instructions reviewed at length.    #Hypertrophic scar - significantly improved and would not recommend additional injections after today  - Intralesional Kenalog (triamcinolone) injection today     Concentration: 10 mg/mL  Site: R upper cutaneous lip  Total volume injected: 0.1cc    Patient was counseled on the possible side effects of injection: pain at site, infection, atrophy of skin, systemic absorption, hypopigmentation, loss of hair.  Questions if any were addressed before proceeding with prep of the site with rubbing alcohol.  Kenalog was injected with sterile syringe and 25 g needle.  Patient tolerated well.     #Seborrheic dermatitis  - Ketoconazole 2% shampoo. Use 3 times weekly. Lather into scalp and leave on for 5 minutes before washing off. You may use your regular shampoo or head and shoulders to wash your hair afterwards if desired.   - Start zoryve 0.3% once daily to affected areas     Return to clinic: 4-6 weeks or sooner if something concerning arises     John Walker MD

## 2024-12-27 ENCOUNTER — TELEPHONE (OUTPATIENT)
Dept: DERMATOLOGY CLINIC | Facility: CLINIC | Age: 24
End: 2024-12-27

## 2024-12-27 NOTE — TELEPHONE ENCOUNTER
Prior Authorization needed    Plan does not cover this medication.  Please call plan at [phone] to initiate prior authorization or call/fax pharmacy to change medication.  Patient ID# is [id number].    Current Outpatient Medications   Medication Sig Dispense Refill           Roflumilast, Antiseborrheic, (ZORYVE) 0.3 % External Foam Apply 1 Application topically daily. Use with flares in scalp 60 g 3

## 2024-12-30 NOTE — TELEPHONE ENCOUNTER
Medication PA Requested: zoryve 0.3% foam                                                           CoverMyMeds Used:  Key:  Quantity: 60gm  Day Supply: 30  Sig: apply daily  DX Code:   L21.9                                  CPT code (if applicable):   Case Number/Pending Ref#:

## (undated) NOTE — Clinical Note
6/2/2017              Christopher DHALIWAL        [address]         Please excuse Falguni Casarez from [place] until cleared. Thank you.      Sincerely,    Trinity Harden MD  [place] , [address] [facility], [address] [place], [place] [address]

## (undated) NOTE — LETTER
VACCINE ADMINISTRATION RECORD  PARENT / GUARDIAN APPROVAL  Date: 2019  Vaccine administered to: Jackeline Osborne     : 7/3/2000    MRN: [id number]    A copy of the appropriate Centers for Disease Control and Prevention Vaccine Information statement h

## (undated) NOTE — LETTER
VACCINE ADMINISTRATION RECORD  PARENT / GUARDIAN APPROVAL  Date: 2017  Vaccine administered to: Richi Benavidez     : 7/3/2000    MRN: [id number]    A copy of the appropriate Centers for Disease Control and Prevention Vaccine Information statement

## (undated) NOTE — LETTER
[facility] [employer] of Child Health Examination       Student's Name  Cheyenne Chaney Birth Da Title                           Date     Signature HEALTH HISTORY          TO BE COMPLETED AND SIGNED BY PARENT/GUARDIAN AND VERIFIED BY HEALTH CARE PROVIDER    ALLERGIES  (Food, drug, insect, other) MEDICATION  (List all prescribed or taken on a regular basis.)     Diagnosis of asthma?   Child wakes during DIABETES SCREENING  BMI>85% age/sex  No And any two of the following:  Family History Yes   Ethnic Minority  No          Signs of Insulin Resistance (hypertension, dyslipidemia, polycystic ovarian syndrome, acanthosis nigricans)    No           At Risk  No Quick-relief  medication (e.g. Short Acting Beta Antagonist): No          Controller medication (e.g. inhaled corticosteroid):   No Other   NEEDS/MODIFICATIONS required in the school setting  None DIETARY Needs/Restrictions     None   SPECIAL INSTR

## (undated) NOTE — Clinical Note
Name: Joseph Richardson School Year:  11th Grade Class: Student ID No.:   Address:  [address] [place] [address] Phone:  [phone] (home)  :  14year old   Name Relationship [address] Work Phone Home Phone Mobile Phone   1.  [employer]o Fix 12. Has anyone in your family had unexplained fainting, seizures, or near drowning? BONE AND JOINT QUESTIONS Yes No   17. Have you ever had an injury to a bone, muscle, ligament, or tendon that caused you to miss a practice or a game?      18. Have you /fall?     36. Have you ever become ill while exercising in the heat?     41. Do you get frequent muscle cramps when exercising? 42. Do you or someone in your family have sickle cell trait or disease? 43.  Have you ever had any problems with your ey Abdomen Yes    Genitourinary (males only)* Yes    Skin:  HSV, lesions suggestive of MRSA, tinea corporis Yes    Neurologic* Yes    MUSCULOSKELETAL     Neck Yes    Back Yes    Shoulder/arm Yes    Elbow/forearm Yes    Wrist/hand/fingers Yes    Hip/thigh Yes laboratory.  We further understand and agree that the results of the performance-enhancing substance testing may be provided to certain individuals in my/our student’s high school as specified in the [facility] Performance-Enhancing Substance Testing Program Prot

## (undated) NOTE — Clinical Note
6/12/2017          To Whom It May Concern:    Jackeline Osborne is currently under my medical care and does not have any activity restrictions at this time. If you require additional information please contact our office.         Sincerely,

## (undated) NOTE — MR AVS SNAPSHOT
[address]  [address]  [phone]               Thank you for choosing us for your health care visit with Reena Han MD.  We are glad to serve you and happy to provide you with this summary Visit [facility] online at  [email or web address].tn

## (undated) NOTE — LETTER
VACCINE ADMINISTRATION RECORD  PARENT / GUARDIAN APPROVAL  Date: 2018  Vaccine administered to: Alejandro Hua     : 7/3/2000    MRN: [id number]    A copy of the appropriate Centers for Disease Control and Prevention Vaccine Information statement h

## (undated) NOTE — LETTER
VACCINE ADMINISTRATION RECORD  PARENT / GUARDIAN APPROVAL  Date: 2020  Vaccine administered to: Rain Beatty     : 7/3/2000    MRN: [id number]    A copy of the appropriate Centers for Disease Control and Prevention Vaccine Information statement

## (undated) NOTE — LETTER
2019              [address] [place] [address]        : 2000    Immunization History   Administered Date(s) Administered   • DTAP 2001, 2002, 2002, 2003, 2006   • HEP B

## (undated) NOTE — Clinical Note
6/2/2017              Ovidio DHALIWAL        [address]         To Whom It May Concern,    Patient has an inguinal hernia and will be unable to be in football until cleared.       Sincerely,    Desiree Balbuena MD  [facility]

## (undated) NOTE — LETTER
Name: Newton Every School Year:  12th Grade Class: Student ID No.:   Address:  [facility] [address] [place] [address] Phone:  [phone] (home)  :  13year old   Name Relationship [address] Work Phone Home Phone Mobile Phone   1.  [facility] 12. Has anyone in your family had unexplained fainting, seizures, or near drowning? BONE AND JOINT QUESTIONS Yes No   17. Have you ever had an injury to a bone, muscle, ligament, or tendon that caused you to miss a practice or a game?      18. Have you /fall?     36. Have you ever become ill while exercising in the heat?     41. Do you get frequent muscle cramps when exercising? 42. Do you or someone in your family have sickle cell trait or disease? 43.  Have you ever had any problems with your ey Genitourinary (males only)* No    Skin:  HSV, lesions suggestive of MRSA, tinea corporis Yes    Neurologic* Yes    MUSCULOSKELETAL     Neck Yes    Back Yes    Shoulder/arm Yes    Elbow/forearm Yes    Wrist/hand/fingers Yes    Hip/thigh Yes    Knee Yes    L state series events or during the school day, and I/our student do/does hereby agree to submit to such testing and analysis by a certified laboratory.  We further understand and agree that the results of the performance-enhancing substance testing may be pr

## (undated) NOTE — MR AVS SNAPSHOT
[address], [address]  [address] [facility]  [phone]               Thank you for choosing us for your health care visit with Trinity Harden MD.  We are glad to serve you and happy to provide you with this summa Your physician has referred you to a specialist.  Your physician or the clinic staff will provide you with the phone number you should call to schedule your appointment.      If you are confident that your benefit plan will not require a referral or authori healthy? Make sure to talk to your teen about who his or her friends are and how they spend time together. Peer pressure can be a problem among teenagers. · Life at home. How is your child’s behavior? Does he or she get along with others in the family?  Is how to spend free time. You can’t always have the final say, but you can encourage healthy habits. Your teen should:  · Get at least 30 minutes to 60 minutes of physical activity every day. This time can be broken up throughout the day.  After-school sports · Let the health care provider know if you or your teen have questions about hygiene or acne. · Bring your teen to the dentist at least twice a year for teeth cleaning and a checkup. · Remind your teen to brush and floss his or her teeth before bed.   Sle driving. Teach your teen to always wear a seat belt, drive the speed limit, and follow the rules of the road. Do not allow your teenager to text or talk on a cell phone while driving. (And don’t do this yourself!  Remember, you set an example.)  · Set rules eased. Offer your love and support.  If your teen talks about death or suicide, seek help right away.      Next checkup at: _______________________________     PARENT NOTES:  Date Last Reviewed: 10/2/2014  © 9585-0017 [employer] [facility] [address] Varicella             07/12/2002 05/23/2012    Deferred                Date(s) Deferred    Meningococcal, Conjugate                          06/02/2017        Tylenol/Acetaminophen Dosing    Please dose every 4 hours as needed,do not give more than 5 do your doctor    Infant Concentrated drops = 50 mg/1.25ml  Children's suspension =100 mg/5 ml  Children's chewable = 100mg  Ibuprofen tablets =200mg                                 Infant gisell [address]        Adult table Seeks friends who share the same beliefs, values, and interests. Friends become more important. Explores romantic and sexual behaviors with others. May be influenced by peers to take risks with alcohol, tobacco, sex, or other things.   Mental Developmen baby begins eating solid foods, introduce nutritious foods early on and often. Sometimes toddlers need to try a food 10 times before they actually accept and enjoy it. It is also important to encourage play time as soon as they start crawling and walking.

## (undated) NOTE — LETTER
VACCINE ADMINISTRATION RECORD  PARENT / GUARDIAN APPROVAL  Date: 2019  Vaccine administered to: Raffy Quiñones     : 7/3/2000    MRN: [id number]    A copy of the appropriate Centers for Disease Control and Prevention Vaccine Information statement h

## (undated) NOTE — LETTER
Name: Frankey Manus School Year:  11th Grade Class: Student ID No.:   Address:  [facility] [address] [place] [address] Phone:  [phone] (home)  :  13year old   Name Relationship [address] Work Phone Home Phone Mobile Phone   1.  Nallely Zhao 13. Does anyone in your family have a heart problem, pacemaker, or implanted defibrillator? No   16. Has anyone in your family had unexplained fainting, seizures, or near drowning?  No   BONE AND JOINT QUESTIONS    17. Have you ever had an injury to a bone, 38. Have you ever had numbness, tingling, or weakness in your arms or legs after being hit or falling? No   39. Have you ever been unable to move your arms / legs after being hit /fall? No   40. Have you ever become ill while exercising in the heat?  No   41 MVP, aortic insufficiency) Yes    Eyes/Ears/Nose/Throat:    · Pupils equal  · Hearing Yes    Lymph nodes Yes    Heart*  · Murmurs (auscultation standing, supine, +/- Valsalva)  · Location of point of maximal impulse (PMI) Yes    Pulses: Simultaneous femora Protocol.  We have reviewed the policy and understand that I/our student may be asked to submit to testing for the presence of performance-enhancing substances in my/his/her body either during [employer] state series events or during the school day, and I/our phyllis

## (undated) NOTE — Clinical Note
VACCINE ADMINISTRATION RECORD  PARENT / GUARDIAN APPROVAL  Date: 2017  Vaccine administered to: Elli Mode     : 7/3/2000    MRN: [id number]    A copy of the appropriate Centers for Disease Control and Prevention Vaccine Information statement h

## (undated) NOTE — LETTER
Patient Name: Magaly Watkins  YOB: 2000          MRN :  [id number]  Date:  8/6/2020  Referring Physician:  Shreyas Dye             Physical Therapy Progress Summary  Diagnosis:  Patellar tendinitis of left knee (M76.52)      Next M 3- Pt will demo proper LLE alignment and good stability with SL squatting activities - NEARLY MET  4- Pt will report no pain with jumping activities - NOT MET     Plan: Continue skilled Physical Therapy 1-2x/week or a total of 4 visits over a 90 day period

## (undated) NOTE — Clinical Note
Damon Pleitez, [address] [facility], [address]       06/12/2017        Patient: Kwadwo Mccurdy   YOB: 2000   Date of Visit: 6/12/2017       Dear  Dr. Joni Calvillo MD,      Thank you for referring Kwadwo Mccurdy to